# Patient Record
Sex: FEMALE | Employment: FULL TIME | URBAN - METROPOLITAN AREA
[De-identification: names, ages, dates, MRNs, and addresses within clinical notes are randomized per-mention and may not be internally consistent; named-entity substitution may affect disease eponyms.]

---

## 2021-12-16 PROBLEM — G25.0 ESSENTIAL TREMOR: Status: ACTIVE | Noted: 2021-12-16

## 2021-12-16 PROBLEM — G62.9 POLYNEUROPATHY: Status: ACTIVE | Noted: 2021-12-16

## 2021-12-16 PROBLEM — R20.2 PARESTHESIAS: Status: ACTIVE | Noted: 2021-12-16

## 2022-01-12 PROBLEM — N39.0 RECURRENT UTI: Status: ACTIVE | Noted: 2022-01-12

## 2022-09-30 ENCOUNTER — APPOINTMENT (OUTPATIENT)
Dept: ULTRASOUND IMAGING | Facility: HOSPITAL | Age: 57
End: 2022-09-30
Attending: STUDENT IN AN ORGANIZED HEALTH CARE EDUCATION/TRAINING PROGRAM
Payer: COMMERCIAL

## 2022-09-30 ENCOUNTER — HOSPITAL ENCOUNTER (OUTPATIENT)
Facility: HOSPITAL | Age: 57
Setting detail: OBSERVATION
Discharge: HOME | End: 2022-10-01
Attending: STUDENT IN AN ORGANIZED HEALTH CARE EDUCATION/TRAINING PROGRAM | Admitting: HOSPITALIST
Payer: COMMERCIAL

## 2022-09-30 ENCOUNTER — HOSPITAL ENCOUNTER (OUTPATIENT)
Age: 57
Discharge: ANOTHER HOSPITAL | End: 2022-09-30
Payer: COMMERCIAL

## 2022-09-30 ENCOUNTER — HOSPITAL ENCOUNTER (INPATIENT)
Facility: HOSPITAL | Age: 57
LOS: 1 days | Discharge: HOME | End: 2022-10-01
Attending: STUDENT IN AN ORGANIZED HEALTH CARE EDUCATION/TRAINING PROGRAM | Admitting: HOSPITALIST
Payer: COMMERCIAL

## 2022-09-30 VITALS
TEMPERATURE: 98 F | DIASTOLIC BLOOD PRESSURE: 97 MMHG | OXYGEN SATURATION: 99 % | SYSTOLIC BLOOD PRESSURE: 155 MMHG | HEART RATE: 76 BPM | RESPIRATION RATE: 18 BRPM

## 2022-09-30 DIAGNOSIS — K80.20 SYMPTOMATIC CHOLELITHIASIS: Primary | ICD-10-CM

## 2022-09-30 DIAGNOSIS — R19.5 DARK STOOLS: ICD-10-CM

## 2022-09-30 DIAGNOSIS — R74.01 TRANSAMINITIS: ICD-10-CM

## 2022-09-30 DIAGNOSIS — R10.11 ABDOMINAL PAIN, RIGHT UPPER QUADRANT: Primary | ICD-10-CM

## 2022-09-30 LAB
ALBUMIN SERPL-MCNC: 4.1 G/DL (ref 3.4–5)
ALP LIVER SERPL-CCNC: 188 U/L
ALT SERPL-CCNC: 117 U/L
AMPHET UR QL SCN: NEGATIVE
ANION GAP SERPL CALC-SCNC: 9 MMOL/L (ref 0–18)
AST SERPL-CCNC: 127 U/L (ref 15–37)
BASOPHILS # BLD AUTO: 0.05 X10(3) UL (ref 0–0.2)
BASOPHILS NFR BLD AUTO: 0.7 %
BENZODIAZ UR QL SCN: NEGATIVE
BILIRUB DIRECT SERPL-MCNC: 0.4 MG/DL (ref 0–0.2)
BILIRUB SERPL-MCNC: 1.1 MG/DL (ref 0.1–2)
BILIRUB UR QL: NEGATIVE
BUN BLD-MCNC: 5 MG/DL (ref 7–18)
BUN/CREAT SERPL: 7.4 (ref 10–20)
CALCIUM BLD-MCNC: 9.2 MG/DL (ref 8.5–10.1)
CANNABINOIDS UR QL SCN: NEGATIVE
CHLORIDE SERPL-SCNC: 103 MMOL/L (ref 98–112)
CO2 SERPL-SCNC: 26 MMOL/L (ref 21–32)
COCAINE UR QL: NEGATIVE
COLOR UR: YELLOW
CREAT BLD-MCNC: 0.68 MG/DL
CREAT UR-SCNC: 120 MG/DL
DEPRECATED RDW RBC AUTO: 61.4 FL (ref 35.1–46.3)
EOSINOPHIL # BLD AUTO: 0.03 X10(3) UL (ref 0–0.7)
EOSINOPHIL NFR BLD AUTO: 0.4 %
ERYTHROCYTE [DISTWIDTH] IN BLOOD BY AUTOMATED COUNT: 15.9 % (ref 11–15)
GFR SERPLBLD BASED ON 1.73 SQ M-ARVRAT: 102 ML/MIN/1.73M2 (ref 60–?)
GLUCOSE BLD-MCNC: 110 MG/DL (ref 70–99)
GLUCOSE UR-MCNC: NEGATIVE MG/DL
HCT VFR BLD AUTO: 37.6 %
HGB BLD-MCNC: 13.6 G/DL
IMM GRANULOCYTES # BLD AUTO: 0.04 X10(3) UL (ref 0–1)
IMM GRANULOCYTES NFR BLD: 0.6 %
KETONES UR-MCNC: 20 MG/DL
LEUKOCYTE ESTERASE UR QL STRIP.AUTO: NEGATIVE
LIPASE SERPL-CCNC: 107 U/L (ref 73–393)
LYMPHOCYTES # BLD AUTO: 1.46 X10(3) UL (ref 1–4)
LYMPHOCYTES NFR BLD AUTO: 21.6 %
MCH RBC QN AUTO: 39.5 PG (ref 26–34)
MCHC RBC AUTO-ENTMCNC: 36.2 G/DL (ref 31–37)
MCV RBC AUTO: 109.3 FL
MDMA UR QL SCN: NEGATIVE
MONOCYTES # BLD AUTO: 0.67 X10(3) UL (ref 0.1–1)
MONOCYTES NFR BLD AUTO: 9.9 %
NEUTROPHILS # BLD AUTO: 4.51 X10 (3) UL (ref 1.5–7.7)
NEUTROPHILS # BLD AUTO: 4.51 X10(3) UL (ref 1.5–7.7)
NEUTROPHILS NFR BLD AUTO: 66.8 %
NITRITE UR QL STRIP.AUTO: NEGATIVE
OSMOLALITY SERPL CALC.SUM OF ELEC: 284 MOSM/KG (ref 275–295)
OXYCODONE UR QL SCN: NEGATIVE
PH UR: 6 [PH] (ref 5–8)
PLATELET # BLD AUTO: 282 10(3)UL (ref 150–450)
POTASSIUM SERPL-SCNC: 3.3 MMOL/L (ref 3.5–5.1)
PROT SERPL-MCNC: 7.4 G/DL (ref 6.4–8.2)
PROT UR-MCNC: NEGATIVE MG/DL
RBC # BLD AUTO: 3.44 X10(6)UL
SARS-COV-2 RNA RESP QL NAA+PROBE: NOT DETECTED
SODIUM SERPL-SCNC: 138 MMOL/L (ref 136–145)
SP GR UR STRIP: 1.01 (ref 1–1.03)
UROBILINOGEN UR STRIP-ACNC: 2
VIT C UR-MCNC: NEGATIVE MG/DL
WBC # BLD AUTO: 6.8 X10(3) UL (ref 4–11)

## 2022-09-30 PROCEDURE — 76705 ECHO EXAM OF ABDOMEN: CPT | Performed by: STUDENT IN AN ORGANIZED HEALTH CARE EDUCATION/TRAINING PROGRAM

## 2022-09-30 PROCEDURE — 99204 OFFICE O/P NEW MOD 45 MIN: CPT | Performed by: NURSE PRACTITIONER

## 2022-09-30 PROCEDURE — 99222 1ST HOSP IP/OBS MODERATE 55: CPT | Performed by: HOSPITALIST

## 2022-09-30 RX ORDER — SODIUM CHLORIDE 9 MG/ML
INJECTION, SOLUTION INTRAVENOUS CONTINUOUS
Status: DISCONTINUED | OUTPATIENT
Start: 2022-09-30 | End: 2022-10-01

## 2022-09-30 RX ORDER — METRONIDAZOLE 500 MG/100ML
500 INJECTION, SOLUTION INTRAVENOUS ONCE
Status: COMPLETED | OUTPATIENT
Start: 2022-09-30 | End: 2022-09-30

## 2022-09-30 RX ORDER — HEPARIN SODIUM 5000 [USP'U]/ML
5000 INJECTION, SOLUTION INTRAVENOUS; SUBCUTANEOUS EVERY 12 HOURS SCHEDULED
Status: DISCONTINUED | OUTPATIENT
Start: 2022-10-01 | End: 2022-10-01

## 2022-09-30 RX ORDER — METRONIDAZOLE 500 MG/100ML
500 INJECTION, SOLUTION INTRAVENOUS EVERY 8 HOURS
Status: DISCONTINUED | OUTPATIENT
Start: 2022-10-01 | End: 2022-10-01

## 2022-09-30 RX ORDER — ONDANSETRON 2 MG/ML
4 INJECTION INTRAMUSCULAR; INTRAVENOUS ONCE
Status: COMPLETED | OUTPATIENT
Start: 2022-09-30 | End: 2022-09-30

## 2022-09-30 RX ORDER — ONDANSETRON 2 MG/ML
4 INJECTION INTRAMUSCULAR; INTRAVENOUS EVERY 6 HOURS PRN
Status: DISCONTINUED | OUTPATIENT
Start: 2022-09-30 | End: 2022-10-01

## 2022-09-30 RX ORDER — PROCHLORPERAZINE EDISYLATE 5 MG/ML
5 INJECTION INTRAMUSCULAR; INTRAVENOUS EVERY 8 HOURS PRN
Status: DISCONTINUED | OUTPATIENT
Start: 2022-09-30 | End: 2022-10-01

## 2022-09-30 RX ORDER — MORPHINE SULFATE 4 MG/ML
4 INJECTION, SOLUTION INTRAMUSCULAR; INTRAVENOUS ONCE
Status: COMPLETED | OUTPATIENT
Start: 2022-09-30 | End: 2022-09-30

## 2022-09-30 RX ORDER — MORPHINE SULFATE 2 MG/ML
2 INJECTION, SOLUTION INTRAMUSCULAR; INTRAVENOUS EVERY 4 HOURS PRN
Status: DISCONTINUED | OUTPATIENT
Start: 2022-09-30 | End: 2022-10-01

## 2022-09-30 RX ORDER — MORPHINE SULFATE 2 MG/ML
4 INJECTION, SOLUTION INTRAMUSCULAR; INTRAVENOUS EVERY 4 HOURS PRN
Status: DISCONTINUED | OUTPATIENT
Start: 2022-09-30 | End: 2022-10-01

## 2022-09-30 NOTE — ED INITIAL ASSESSMENT (HPI)
Patient presents with concern of right side pain since Wednesday; endorses nausea, poor appetite, dark colored diarrhea. Denies vomiting, denies pain radiating to the back or abdomen. Reports use of tylenol for pain with minimal improvement.

## 2022-09-30 NOTE — ED INITIAL ASSESSMENT (HPI)
Patient arrives ambulatory through triage with c/o of RUQ pain since wed. Patient c/o of nausea as well.  Hx of gallstones

## 2022-10-01 VITALS
SYSTOLIC BLOOD PRESSURE: 127 MMHG | BODY MASS INDEX: 26 KG/M2 | RESPIRATION RATE: 16 BRPM | TEMPERATURE: 98 F | DIASTOLIC BLOOD PRESSURE: 74 MMHG | HEART RATE: 67 BPM | WEIGHT: 154.88 LBS | OXYGEN SATURATION: 99 %

## 2022-10-01 LAB
ALBUMIN SERPL-MCNC: 3.1 G/DL (ref 3.4–5)
ALBUMIN/GLOB SERPL: 1.2 {RATIO} (ref 1–2)
ALP LIVER SERPL-CCNC: 139 U/L
ALT SERPL-CCNC: 89 U/L
ANION GAP SERPL CALC-SCNC: 4 MMOL/L (ref 0–18)
AST SERPL-CCNC: 81 U/L (ref 15–37)
BASOPHILS # BLD AUTO: 0.05 X10(3) UL (ref 0–0.2)
BASOPHILS NFR BLD AUTO: 0.8 %
BILIRUB SERPL-MCNC: 0.8 MG/DL (ref 0.1–2)
BUN BLD-MCNC: 4 MG/DL (ref 7–18)
BUN/CREAT SERPL: 7.4 (ref 10–20)
CALCIUM BLD-MCNC: 8.1 MG/DL (ref 8.5–10.1)
CHLORIDE SERPL-SCNC: 108 MMOL/L (ref 98–112)
CO2 SERPL-SCNC: 30 MMOL/L (ref 21–32)
CREAT BLD-MCNC: 0.54 MG/DL
DEPRECATED RDW RBC AUTO: 62.6 FL (ref 35.1–46.3)
EOSINOPHIL # BLD AUTO: 0.11 X10(3) UL (ref 0–0.7)
EOSINOPHIL NFR BLD AUTO: 1.8 %
ERYTHROCYTE [DISTWIDTH] IN BLOOD BY AUTOMATED COUNT: 15.9 % (ref 11–15)
EST. AVERAGE GLUCOSE BLD GHB EST-MCNC: 85 MG/DL (ref 68–126)
GFR SERPLBLD BASED ON 1.73 SQ M-ARVRAT: 108 ML/MIN/1.73M2 (ref 60–?)
GLOBULIN PLAS-MCNC: 2.5 G/DL (ref 2.8–4.4)
GLUCOSE BLD-MCNC: 117 MG/DL (ref 70–99)
HBA1C MFR BLD: 4.6 % (ref ?–5.7)
HCT VFR BLD AUTO: 32.3 %
HGB BLD-MCNC: 11.5 G/DL
IMM GRANULOCYTES # BLD AUTO: 0.04 X10(3) UL (ref 0–1)
IMM GRANULOCYTES NFR BLD: 0.7 %
INR BLD: 1.07 (ref 0.85–1.16)
LYMPHOCYTES # BLD AUTO: 1.69 X10(3) UL (ref 1–4)
LYMPHOCYTES NFR BLD AUTO: 27.9 %
MAGNESIUM SERPL-MCNC: 1.4 MG/DL (ref 1.6–2.6)
MAGNESIUM SERPL-MCNC: 1.7 MG/DL (ref 1.6–2.6)
MCH RBC QN AUTO: 39.4 PG (ref 26–34)
MCHC RBC AUTO-ENTMCNC: 35.6 G/DL (ref 31–37)
MCV RBC AUTO: 110.6 FL
MONOCYTES # BLD AUTO: 0.6 X10(3) UL (ref 0.1–1)
MONOCYTES NFR BLD AUTO: 9.9 %
NEUTROPHILS # BLD AUTO: 3.56 X10 (3) UL (ref 1.5–7.7)
NEUTROPHILS # BLD AUTO: 3.56 X10(3) UL (ref 1.5–7.7)
NEUTROPHILS NFR BLD AUTO: 58.9 %
OSMOLALITY SERPL CALC.SUM OF ELEC: 292 MOSM/KG (ref 275–295)
PHOSPHATE SERPL-MCNC: 2.5 MG/DL (ref 2.5–4.9)
PLATELET # BLD AUTO: 216 10(3)UL (ref 150–450)
POTASSIUM SERPL-SCNC: 3.1 MMOL/L (ref 3.5–5.1)
PROT SERPL-MCNC: 5.6 G/DL (ref 6.4–8.2)
PROTHROMBIN TIME: 13.8 SECONDS (ref 11.6–14.8)
RBC # BLD AUTO: 2.92 X10(6)UL
SODIUM SERPL-SCNC: 142 MMOL/L (ref 136–145)
WBC # BLD AUTO: 6.1 X10(3) UL (ref 4–11)

## 2022-10-01 PROCEDURE — 99222 1ST HOSP IP/OBS MODERATE 55: CPT | Performed by: SURGERY

## 2022-10-01 PROCEDURE — 99222 1ST HOSP IP/OBS MODERATE 55: CPT | Performed by: INTERNAL MEDICINE

## 2022-10-01 PROCEDURE — 99239 HOSP IP/OBS DSCHRG MGMT >30: CPT | Performed by: HOSPITALIST

## 2022-10-01 RX ORDER — POTASSIUM CHLORIDE 14.9 MG/ML
20 INJECTION INTRAVENOUS ONCE
Status: COMPLETED | OUTPATIENT
Start: 2022-10-01 | End: 2022-10-01

## 2022-10-01 RX ORDER — ESCITALOPRAM OXALATE 10 MG/1
10 TABLET ORAL DAILY
Qty: 30 TABLET | Refills: 1 | Status: SHIPPED | OUTPATIENT
Start: 2022-10-01

## 2022-10-01 RX ORDER — ACETAMINOPHEN 325 MG/1
650 TABLET ORAL EVERY 6 HOURS PRN
Status: DISCONTINUED | OUTPATIENT
Start: 2022-10-01 | End: 2022-10-01

## 2022-10-01 RX ORDER — MAGNESIUM OXIDE 400 MG/1
800 TABLET ORAL ONCE
Status: COMPLETED | OUTPATIENT
Start: 2022-10-01 | End: 2022-10-01

## 2022-10-01 NOTE — PLAN OF CARE
Problem: Patient Centered Care  Goal: Patient preferences are identified and integrated in the patient's plan of care  Description: Interventions:  - What would you like us to know as we care for you? \"I am from home alone. My  recently passed away last week. \"  - Provide timely, complete, and accurate information to patient/family  - Incorporate patient and family knowledge, values, beliefs, and cultural backgrounds into the planning and delivery of care  - Encourage patient/family to participate in care and decision-making at the level they choose  - Honor patient and family perspectives and choices  Outcome: Progressing     Problem: Patient/Family Goals  Goal: Patient/Family Long Term Goal  Description: Patient's Long Term Goal: \"to go home\"    Interventions:  -Follow up MD appt  -Take meds as prescribed  - See additional Care Plan goals for specific interventions  Outcome: Progressing  Goal: Patient/Family Short Term Goal  Description: Patient's Short Term Goal: \"to prevent pain\"    Interventions:   -Monitor VS  -Check labs results  -Administer IVF and IV ABT as ordered  -Provide pain meds as prescribed  - See additional Care Plan goals for specific interventions  Outcome: Progressing     Problem: Pre-existing medical condition  Goal: Maintenance of stable medical condition  Description: INTERVENTIONS:  - Administer medications and treatments as ordered  - Monitor for and notify internist of changes as needed  - Conduct history & physical assessment within 24 hours of admission  - Assess & evaluate for further medical treatment if indicated  Outcome: Progressing     Problem: SAFETY ADULT - FALL  Goal: Free from fall injury  Description: INTERVENTIONS AS NEEDED:  - Assess patient for physical needs  - Identify cognitive and physical deficits and behaviors that affect risk of falls  - [place] fall precautions as indicated by assessment  - Educate patient/family on patient safety including physical imitations  - Instruct patient to call for assistance with activity based on assessment  - Modify environment to reduce risk of injury  - Provide assistive devices as appropriate  - Consider occupational and/or physical therapy consult to assist with strengthening/mobility  - Encourage toileting schedule  - Evaluate and order additional treatment as indicated  Outcome: Progressing     Problem: Risk for Infection  Goal: Absence of fever/infection during anticipated neutropenic period  Description: INTERVENTIONS:  - Monitor WBC  - Implement neutropenic guidelines  Outcome: Progressing     Problem: GASTROINTESTINAL - ADULT  Goal: Minimal or absence of nausea and vomiting  Description: INTERVENTIONS:  - Maintain adequate hydration with IV or PO as ordered and tolerated  - Nasogastric tube to low intermittent suction as ordered  - Evaluate effectiveness of ordered antiemetic medications  - Provide nonpharmacologic comfort measures as appropriate  - Advance diet as tolerated, if ordered  - Obtain nutritional consult as needed  - Evaluate fluid balance  Outcome: Progressing  Goal: Maintains or returns to baseline bowel function  Description: INTERVENTIONS:  - Assess bowel function  - Maintain adequate hydration with IV or PO as ordered and tolerated  - Evaluate effectiveness of GI medications  - Encourage mobilization and activity  - Obtain nutritional consult as needed  - Establish a toileting routine/schedule  - Consider collaborating with pharmacy to review patient's medication profile  Outcome: Progressing     Problem: ANXIETY  Goal: Will report anxiety at manageable levels  Description: INTERVENTIONS:  - Administer medication as ordered  - Teach and rehearse alternative coping skills  - Provide emotional support with 1:1 interaction with staff  Outcome: Progressing     Problem: DEATH & DYING  Goal: Pt/Family communicate acceptance of impending death and feel psychological comfort and peace  Description: INTERVENTIONS:  - Assess patient/family anxiety and grief process related to end of life issues  - Provide emotional and spiritual support  - Provide information about the patient's health status with consideration of family and cultural values  - Communicate willingness to discuss death and facilitate grief process  with patient/family as appropriate  - Emphasize sustaining relationships within family system and community, or brittney/spiritual traditions  - Initiate Spiritual Care consult as needed  Outcome: Progressing     Problem: DRUG ABUSE/DETOX  Goal: Will have no detox symptoms and will verbalize plan for changing drug-related behavior  Description: INTERVENTIONS:  - Administer medication as ordered  - Monitor physical status  - Provide emotional support with 1:1 interaction with staff  - Encourage 12-Step involvement or recovery focused alternative  Outcome: Progressing     Patient is alert and oriented x 4, admitted with diagnosis of symptomatic cholelithiases. She reported RUQ abd pain with painscale of 4/10, will administer pain meds given as needed. Oriented to room. Safety, seizure, and fall precautions initiated. Currently on CIWA protocol. Call light within reach. Frequent rounding by nursing staff.

## 2022-10-01 NOTE — PLAN OF CARE
Peripheral IV removed, Tele removed, Pt belongings home with patient and her sister. AVS reviewed with patient. Problem: Patient Centered Care  Goal: Patient preferences are identified and integrated in the patient's plan of care  Description: Interventions:  - What would you like us to know as we care for you? \"I am from home alone. My  recently passed away last week. \"  - Provide timely, complete, and accurate information to patient/family  - Incorporate patient and family knowledge, values, beliefs, and cultural backgrounds into the planning and delivery of care  - Encourage patient/family to participate in care and decision-making at the level they choose  - Honor patient and family perspectives and choices  Outcome: Completed     Problem: Patient/Family Goals  Goal: Patient/Family Long Term Goal  Description: Patient's Long Term Goal: \"to go home\"    Interventions:  -Follow up MD appt  -Take meds as prescribed  - See additional Care Plan goals for specific interventions  Outcome: Completed  Goal: Patient/Family Short Term Goal  Description: Patient's Short Term Goal: \"to prevent pain\"    Interventions:   -Monitor VS  -Check labs results  -Administer IVF and IV ABT as ordered  -Provide pain meds as prescribed  - See additional Care Plan goals for specific interventions  Outcome: Completed     Problem: Pre-existing medical condition  Goal: Maintenance of stable medical condition  Description: INTERVENTIONS:  - Administer medications and treatments as ordered  - Monitor for and notify internist of changes as needed  - Conduct history & physical assessment within 24 hours of admission  - Assess & evaluate for further medical treatment if indicated  Outcome: Completed     Problem: SAFETY ADULT - FALL  Goal: Free from fall injury  Description: INTERVENTIONS AS NEEDED:  - Assess patient for physical needs  - Identify cognitive and physical deficits and behaviors that affect risk of falls  - [place] fall precautions as indicated by assessment  - Educate patient/family on patient safety including physical imitations  - Instruct patient to call for assistance with activity based on assessment  - Modify environment to reduce risk of injury  - Provide assistive devices as appropriate  - Consider occupational and/or physical therapy consult to assist with strengthening/mobility  - Encourage toileting schedule  - Evaluate and order additional treatment as indicated  Outcome: Completed     Problem: Risk for Infection  Goal: Absence of fever/infection during anticipated neutropenic period  Description: INTERVENTIONS:  - Monitor WBC  - Implement neutropenic guidelines  Outcome: Completed     Problem: GASTROINTESTINAL - ADULT  Goal: Minimal or absence of nausea and vomiting  Description: INTERVENTIONS:  - Maintain adequate hydration with IV or PO as ordered and tolerated  - Nasogastric tube to low intermittent suction as ordered  - Evaluate effectiveness of ordered antiemetic medications  - Provide nonpharmacologic comfort measures as appropriate  - Advance diet as tolerated, if ordered  - Obtain nutritional consult as needed  - Evaluate fluid balance  Outcome: Completed  Goal: Maintains or returns to baseline bowel function  Description: INTERVENTIONS:  - Assess bowel function  - Maintain adequate hydration with IV or PO as ordered and tolerated  - Evaluate effectiveness of GI medications  - Encourage mobilization and activity  - Obtain nutritional consult as needed  - Establish a toileting routine/schedule  - Consider collaborating with pharmacy to review patient's medication profile  Outcome: Completed     Problem: ANXIETY  Goal: Will report anxiety at manageable levels  Description: INTERVENTIONS:  - Administer medication as ordered  - Teach and rehearse alternative coping skills  - Provide emotional support with 1:1 interaction with staff  Outcome: Completed     Problem: DEATH & DYING  Goal: Pt/Family communicate acceptance of impending death and feel psychological comfort and peace  Description: INTERVENTIONS:  - Assess patient/family anxiety and grief process related to end of life issues  - Provide emotional and spiritual support  - Provide information about the patient's health status with consideration of family and cultural values  - Communicate willingness to discuss death and facilitate grief process  with patient/family as appropriate  - Emphasize sustaining relationships within family system and community, or brittney/spiritual traditions  - Initiate Spiritual Care consult as needed  Outcome: Completed     Problem: DRUG ABUSE/DETOX  Goal: Will have no detox symptoms and will verbalize plan for changing drug-related behavior  Description: INTERVENTIONS:  - Administer medication as ordered  - Monitor physical status  - Provide emotional support with 1:1 interaction with staff  - Encourage 12-Step involvement or recovery focused alternative  Outcome: Completed

## 2022-10-01 NOTE — PROGRESS NOTES
ADMISSION NOTE    65year old female with h/o HTN, nephrolithiasis, cholelithiasis  presents with RUQ pain. Reports that she has been drinking alcohol regularly over the past week as her  passed away. US gall stones,  LFTs elevated. . Available medical records partially reviewed. Dictation to follow.     Genevieve Segundo M.D.  9/30/2022

## 2022-10-01 NOTE — ED QUICK NOTES
Orders for admission, patient is aware of plan and ready to go upstairs. Any questions, please call ED RN Tanner Paulino at extension [phone].      Patient Covid vaccination status: Fully vaccinated     COVID Test Ordered in ED: Rapid SARS-CoV-2 by PCR    COVID Suspicion at Admission: Low clinical suspicion for COVID    Running Infusions:  rocephin, flagyl due next     Mental Status/LOC at time of transport: a/ox4    Other pertinent information: pt coming up w/ rocephin running; flagyl coming with to start next

## 2022-10-01 NOTE — H&P
[facility]    PATIENT'S NAME: Derik Mahoney   ATTENDING PHYSICIAN: Susan Vasquez MD   PATIENT ACCOUNT#:   [de-identified]    LOCATION:  [address] [place] [address] RECORD #:   [id number]       YOB: 1965  ADMISSION DATE:       09/30/2022    HISTORY AND PHYSICAL EXAMINATION    DATE OF EXAMINATION:  09/30/2022    CHIEF COMPLAINT:  Abdominal pain, nausea, and diarrhea. HISTORY OF PRESENT ILLNESS:  This is a very pleasant 18-year-old woman with a past medical history of hypertension, nephrolithiasis and cholelithiasis. She states that in November 2021, she had some mild vague abdominal discomfort for which she had a workup. She is not sure what exact testing was done, but she was told at the time that she did have some gallstones. The discomfort at that time, however, was not severe. On Wednesday, she developed pain in the right upper quadrant below her ribcage radiating to her back. It was associated with poor appetite. No fever or chills. She did have nausea and on the day of admission, had 3 episodes of diarrhea in the morning with a small stool, but it was liquidy. She denies any travel history. No new medication. The discomfort has never been as severe as this was. She also tells me that her  who had a lung transplant for pulmonary fibrosis 3 years ago was quite ill for the last several months and passed away about 1 week and a half ago. She says that since that time she has been drinking regularly 3 to 4 drinks a day, wine or vodka and ginger ale. She said that prior to this she drank rarely. She denies any history of any alcohol-related issues in the past.  No history of pancreatitis. Last drink was the day prior to admission. She denies any history of alcohol withdrawal symptoms. Her evaluation in the emergency room included an ultrasound of the gallbladder that revealed cholelithiasis with no additional sonographic manifestations of acute cholecystitis.   There was no significant biliary ductal dilatation. There were some small simple-appearing renal cysts and mild dilatation of the right renal pelvis with no velma hydronephrosis. This could be related to a variant extrarenal pelvis but are incompletely assessed by the ultrasound. Her urine revealed 1 to 5 white cells, 0 to 2 red cells, 1+ bacteria, few squamous epithelial cells, leukocyte esterase and nitrite negative. Glucose was 110, sodium 138, potassium 3.3, chloride 103, CO2 of 26, BUN of 5 with a creatinine 0.68, calcium 9.2, anion gap of 9. Alkaline phosphatase 188, , , total bilirubin 1.1 and direct bilirubin 0.4 and lipase was normal at 107. Albumin was normal at 4.1. White count was 6.8 with a hemoglobin of 13.6 and a platelet count of 715,894. MCV was 109. There were 66% neutrophils and 21% lymphocytes. Rapid COVID testing was negative. The emergency room physician consulted GI who requested surgical evaluation as well and the patient was admitted for further evaluation and treatment. PAST MEDICAL HISTORY:  Significant for hypertension, kidney stones, history of SVT in the past.  She was placed on diltiazem to cover both her hypertension and SVT and as far she knows has not had any further episodes of SVT. Cholelithiasis. PAST SURGICAL HISTORY:  Foot surgery after a fracture, robotic-assisted hysterectomy, cystoscopy with removal of bladder stone in the past, lithotripsy and stent placement for kidney stones previously as well. ALLERGIES:  Macrobid apparently caused neuropathy. FAMILY HISTORY:  Her mother is 80 and apparently healthy. Her father  at 80. He had coronary artery disease and had his first heart attack at 58. He also had diabetes. SOCIAL HISTORY:  The patient does not smoke. Previously, drank alcohol rarely over the last few months during her 's illness and recent death. She has had 3 to 4 drinks of wine or vodka daily.   She works as a  and is concerned about getting back to work as she has been off for 2 weeks following her 's death. REVIEW OF SYSTEMS:  Her appetite has been poor. She denies any dysuria, increased frequency of urination or hematuria. She had an episode of diarrhea but denies any recent antibiotics or travel history. No melena, hematochezia. No dysuria. There are otherwise no additional pertinent positives or negatives on the 12-point review of systems except as listed in the History of Present Illness. PHYSICAL EXAMINATION:    VITAL SIGNS:  Temperature was 98.3, pulse 69, respiratory rate 16, blood pressure 153/82, O2 saturation 94% on room air. HEENT:  Normocephalic, atraumatic. Pupils equal, reactive. Sclerae anicteric. There was no sinus tenderness. Mucous membranes were moist.  NECK:  Supple. LUNGS:  Clear with easy respiratory excursions. I did not appreciate any wheezes, rhonchi, or rales. HEART:  Regular rate and rhythm. Normal S1, S2.  ABDOMEN:  Soft. There was tenderness to palpation in the right upper quadrant, with no guarding or rebound. Bowel sounds were slightly hypoactive. EXTREMITIES:  No clubbing, cyanosis, calf tenderness, palpable cords, or edema. SKIN:  Warm and dry without any significant rashes. NEUROLOGIC:  The patient was awake, alert, oriented x3 with no focal motor or sensory deficits. PSYCHIATRIC:  Her mood and affect were depressed but very cooperative with the interview and exam.  She was tearful at times, particularly when talking about her . BACK:  There was no costovertebral angle tenderness noted. LABORATORY DATA:  Previously mentioned. ASSESSMENT AND PLAN:    1. Right upper quadrant abdominal pain with mildly elevated liver function tests and evidence of cholelithiasis. No ultrasound evidence of cholecystitis or biliary ductal dilatation.   Question whether the patient's current symptoms are related to an alcohol-induced hepatitis versus cholecystitis. We will start antibiotics tonight, pending evaluation by GI and Surgical Services. This could be discontinued if they do not feel the patient should remain on antibiotics. Question whether a HIDA scan would be of benefit or whether a CT scan would be helpful. In the meantime, we will treat symptomatically with antiemetics and pain medications. Again, await GI and Surgical recommendations. Will also place on proton pump inhibitor. 2.   Hypertension. Blood pressure has ranged for the most part between 130 and 150. We will continue diltiazem in the morning as long as her blood pressure is higher than 130 as we would like to avoid any issues with SVT during her illness. We will place on remote telemetry for further monitoring. 3.   Hypokalemia. Check magnesium level and replace potassium. 4.   Mild nonfasting hyperglycemia, likely stress related. We will check A1c level. 5.   The patient is not anemic, but does have an elevated MCV and RDW. May be related to alcohol consumption. We will place on banana bag with thiamine, folic acid and MVI. 6.   Increased alcohol intake over the past 3 months or so. The patient has no history in the past other than a symptoms of alcohol withdrawal.  We will however place on CIWA protocol. We will not order benzodiazepines unless the patient becomes symptomatic as I would like to track these as she also needs narcotic pain medications for her abdominal pain. 7.   DVT prophylaxis. We will place on SCDs. We will hold anticoagulation until after GI and surgical evaluation in case any invasive procedures are planned today. The patient may be up ad wilman. We will also place the SCDs. 8.   Recent loss of her  with situational depression. Follow up with her primary care provider. 9.   Ultrasound revealed mild dilatation of the right renal pelvis with no hydronephrosis. Could be related to a variant extrarenal pelvis.   Recommend the patient to follow up with her primary care physician as an outpatient, perhaps obtain a nonemergent kidney ultrasound. 10. The patient's current clinical status and proposed treatment plan was discussed with her. All of her questions were answered, and she agreed with the plan of care as outlined above.      Dictated By Ilene Gurrola MD  d: 10/01/2022 03:43:38  t: 10/01/2022 03:57:58  [facility] [id number]  KEANU/

## 2022-10-11 ENCOUNTER — TELEPHONE (OUTPATIENT)
Dept: SURGERY | Facility: CLINIC | Age: 57
End: 2022-10-11

## 2022-10-21 ENCOUNTER — OFFICE VISIT (OUTPATIENT)
Dept: SURGERY | Facility: CLINIC | Age: 57
End: 2022-10-21
Payer: COMMERCIAL

## 2022-10-21 VITALS — WEIGHT: 154 LBS | BODY MASS INDEX: 26 KG/M2

## 2022-10-21 DIAGNOSIS — K80.00 CHOLELITHIASIS AND ACUTE CHOLECYSTITIS WITHOUT OBSTRUCTION: Primary | ICD-10-CM

## 2022-10-21 PROCEDURE — 99214 OFFICE O/P EST MOD 30 MIN: CPT | Performed by: SURGERY

## 2022-11-15 ENCOUNTER — LAB ENCOUNTER (OUTPATIENT)
Dept: LAB | Age: 57
End: 2022-11-15
Attending: SURGERY
Payer: COMMERCIAL

## 2022-11-15 DIAGNOSIS — Z01.818 PREOP TESTING: ICD-10-CM

## 2022-11-16 LAB — SARS-COV-2 RNA RESP QL NAA+PROBE: NOT DETECTED

## 2022-11-16 RX ORDER — ALPRAZOLAM 0.25 MG/1
0.25 TABLET ORAL DAILY PRN
COMMUNITY
Start: 2022-10-26

## 2022-11-17 ENCOUNTER — ANESTHESIA (OUTPATIENT)
Dept: SURGERY | Facility: HOSPITAL | Age: 57
End: 2022-11-17
Payer: COMMERCIAL

## 2022-11-17 ENCOUNTER — HOSPITAL ENCOUNTER (OUTPATIENT)
Facility: HOSPITAL | Age: 57
Setting detail: HOSPITAL OUTPATIENT SURGERY
Discharge: HOME | End: 2022-11-17
Attending: SURGERY | Admitting: SURGERY
Payer: COMMERCIAL

## 2022-11-17 ENCOUNTER — ANESTHESIA EVENT (OUTPATIENT)
Dept: SURGERY | Facility: HOSPITAL | Age: 57
End: 2022-11-17
Payer: COMMERCIAL

## 2022-11-17 VITALS
RESPIRATION RATE: 16 BRPM | HEART RATE: 56 BPM | TEMPERATURE: 98 F | HEIGHT: 65 IN | OXYGEN SATURATION: 98 % | BODY MASS INDEX: 24.32 KG/M2 | SYSTOLIC BLOOD PRESSURE: 130 MMHG | WEIGHT: 146 LBS | DIASTOLIC BLOOD PRESSURE: 73 MMHG

## 2022-11-17 DIAGNOSIS — Z01.818 PREOP TESTING: Primary | ICD-10-CM

## 2022-11-17 DIAGNOSIS — K80.00 CHOLELITHIASIS AND ACUTE CHOLECYSTITIS WITHOUT OBSTRUCTION: ICD-10-CM

## 2022-11-17 PROCEDURE — 0FT44ZZ RESECTION OF GALLBLADDER, PERCUTANEOUS ENDOSCOPIC APPROACH: ICD-10-PCS | Performed by: SURGERY

## 2022-11-17 PROCEDURE — 47562 LAPAROSCOPIC CHOLECYSTECTOMY: CPT | Performed by: SURGERY

## 2022-11-17 RX ORDER — MORPHINE SULFATE 4 MG/ML
4 INJECTION, SOLUTION INTRAMUSCULAR; INTRAVENOUS EVERY 10 MIN PRN
Status: DISCONTINUED | OUTPATIENT
Start: 2022-11-17 | End: 2022-11-17

## 2022-11-17 RX ORDER — HYDROMORPHONE HYDROCHLORIDE 1 MG/ML
0.4 INJECTION, SOLUTION INTRAMUSCULAR; INTRAVENOUS; SUBCUTANEOUS EVERY 5 MIN PRN
Status: DISCONTINUED | OUTPATIENT
Start: 2022-11-17 | End: 2022-11-17

## 2022-11-17 RX ORDER — ONDANSETRON 2 MG/ML
INJECTION INTRAMUSCULAR; INTRAVENOUS AS NEEDED
Status: DISCONTINUED | OUTPATIENT
Start: 2022-11-17 | End: 2022-11-17 | Stop reason: SURG

## 2022-11-17 RX ORDER — HYDROCODONE BITARTRATE AND ACETAMINOPHEN 5; 325 MG/1; MG/1
1 TABLET ORAL EVERY 6 HOURS PRN
Qty: 12 TABLET | Refills: 0 | Status: SHIPPED | OUTPATIENT
Start: 2022-11-17

## 2022-11-17 RX ORDER — DEXAMETHASONE SODIUM PHOSPHATE 4 MG/ML
VIAL (ML) INJECTION AS NEEDED
Status: DISCONTINUED | OUTPATIENT
Start: 2022-11-17 | End: 2022-11-17 | Stop reason: SURG

## 2022-11-17 RX ORDER — NALOXONE HYDROCHLORIDE 0.4 MG/ML
80 INJECTION, SOLUTION INTRAMUSCULAR; INTRAVENOUS; SUBCUTANEOUS AS NEEDED
Status: DISCONTINUED | OUTPATIENT
Start: 2022-11-17 | End: 2022-11-17

## 2022-11-17 RX ORDER — SODIUM CHLORIDE, SODIUM LACTATE, POTASSIUM CHLORIDE, CALCIUM CHLORIDE 600; 310; 30; 20 MG/100ML; MG/100ML; MG/100ML; MG/100ML
INJECTION, SOLUTION INTRAVENOUS CONTINUOUS
Status: DISCONTINUED | OUTPATIENT
Start: 2022-11-17 | End: 2022-11-17

## 2022-11-17 RX ORDER — MORPHINE SULFATE 10 MG/ML
6 INJECTION, SOLUTION INTRAMUSCULAR; INTRAVENOUS EVERY 10 MIN PRN
Status: DISCONTINUED | OUTPATIENT
Start: 2022-11-17 | End: 2022-11-17

## 2022-11-17 RX ORDER — CEFAZOLIN SODIUM/WATER 2 G/20 ML
2 SYRINGE (ML) INTRAVENOUS ONCE
Status: COMPLETED | OUTPATIENT
Start: 2022-11-17 | End: 2022-11-17

## 2022-11-17 RX ORDER — LIDOCAINE HYDROCHLORIDE 10 MG/ML
INJECTION, SOLUTION EPIDURAL; INFILTRATION; INTRACAUDAL; PERINEURAL AS NEEDED
Status: DISCONTINUED | OUTPATIENT
Start: 2022-11-17 | End: 2022-11-17 | Stop reason: SURG

## 2022-11-17 RX ORDER — HYDROMORPHONE HYDROCHLORIDE 1 MG/ML
0.2 INJECTION, SOLUTION INTRAMUSCULAR; INTRAVENOUS; SUBCUTANEOUS EVERY 5 MIN PRN
Status: DISCONTINUED | OUTPATIENT
Start: 2022-11-17 | End: 2022-11-17

## 2022-11-17 RX ORDER — ROCURONIUM BROMIDE 10 MG/ML
INJECTION, SOLUTION INTRAVENOUS AS NEEDED
Status: DISCONTINUED | OUTPATIENT
Start: 2022-11-17 | End: 2022-11-17 | Stop reason: SURG

## 2022-11-17 RX ORDER — LABETALOL HYDROCHLORIDE 5 MG/ML
INJECTION, SOLUTION INTRAVENOUS AS NEEDED
Status: DISCONTINUED | OUTPATIENT
Start: 2022-11-17 | End: 2022-11-17 | Stop reason: SURG

## 2022-11-17 RX ORDER — HYDROMORPHONE HYDROCHLORIDE 1 MG/ML
0.6 INJECTION, SOLUTION INTRAMUSCULAR; INTRAVENOUS; SUBCUTANEOUS EVERY 5 MIN PRN
Status: DISCONTINUED | OUTPATIENT
Start: 2022-11-17 | End: 2022-11-17

## 2022-11-17 RX ORDER — BUPIVACAINE HYDROCHLORIDE AND EPINEPHRINE 5; 5 MG/ML; UG/ML
INJECTION, SOLUTION PERINEURAL AS NEEDED
Status: DISCONTINUED | OUTPATIENT
Start: 2022-11-17 | End: 2022-11-17

## 2022-11-17 RX ORDER — MORPHINE SULFATE 4 MG/ML
2 INJECTION, SOLUTION INTRAMUSCULAR; INTRAVENOUS EVERY 10 MIN PRN
Status: DISCONTINUED | OUTPATIENT
Start: 2022-11-17 | End: 2022-11-17

## 2022-11-17 RX ORDER — ACETAMINOPHEN 500 MG
1000 TABLET ORAL ONCE
Status: COMPLETED | OUTPATIENT
Start: 2022-11-17 | End: 2022-11-17

## 2022-11-17 RX ORDER — IBUPROFEN 600 MG/1
600 TABLET ORAL EVERY 8 HOURS PRN
Qty: 30 TABLET | Refills: 0 | Status: SHIPPED | OUTPATIENT
Start: 2022-11-17

## 2022-11-17 RX ADMIN — LABETALOL HYDROCHLORIDE 5 MG: 5 INJECTION, SOLUTION INTRAVENOUS at 09:21:00

## 2022-11-17 RX ADMIN — ROCURONIUM BROMIDE 40 MG: 10 INJECTION, SOLUTION INTRAVENOUS at 08:58:00

## 2022-11-17 RX ADMIN — SODIUM CHLORIDE, SODIUM LACTATE, POTASSIUM CHLORIDE, CALCIUM CHLORIDE: 600; 310; 30; 20 INJECTION, SOLUTION INTRAVENOUS at 09:53:00

## 2022-11-17 RX ADMIN — DEXAMETHASONE SODIUM PHOSPHATE 8 MG: 4 MG/ML VIAL (ML) INJECTION at 08:58:00

## 2022-11-17 RX ADMIN — ONDANSETRON 4 MG: 2 INJECTION INTRAMUSCULAR; INTRAVENOUS at 08:58:00

## 2022-11-17 RX ADMIN — CEFAZOLIN SODIUM/WATER 2 G: 2 G/20 ML SYRINGE (ML) INTRAVENOUS at 09:01:00

## 2022-11-17 RX ADMIN — SODIUM CHLORIDE, SODIUM LACTATE, POTASSIUM CHLORIDE, CALCIUM CHLORIDE: 600; 310; 30; 20 INJECTION, SOLUTION INTRAVENOUS at 08:53:00

## 2022-11-17 RX ADMIN — LIDOCAINE HYDROCHLORIDE 25 MG: 10 INJECTION, SOLUTION EPIDURAL; INFILTRATION; INTRACAUDAL; PERINEURAL at 08:58:00

## 2022-11-17 NOTE — INTERVAL H&P NOTE
Pre-op Diagnosis: Cholelithiasis and acute cholecystitis without obstruction [K80.00]    The above referenced H&P was reviewed by Román Granados MD on 11/17/2022, the patient was examined and no significant changes have occurred in the patient's condition since the H&P was performed. I discussed with the patient and/or legal representative the potential benefits, risks and side effects of this procedure; the likelihood of the patient achieving goals; and potential problems that might occur during recuperation. I discussed reasonable alternatives to the procedure, including risks, benefits and side effects related to the alternatives and risks related to not receiving this procedure. We will proceed with procedure as planned.

## 2022-11-17 NOTE — OPERATIVE REPORT
Patient Name:  Esha Quinones  MR:  [id number]  :  1965  DOS:  23    Preop Dx:  Cholelithiasis and chronic cholecystitis without obstruction    Postop Dx:  Cholelithiasis and chronic cholecystitis without obstruction    Procedure:  Laparoscopic cholecystectomy  Surgeon:  Patrice Henson MD  Surgical Assistant.: Tretha Barthel, CSA  EBL: Blood Output: 5 mL (2022  2:51 AM)    Complication:  None    INDICATION:  Pt is a 65year old female who with Cholelithiasis and acute cholecystitis without obstruction [K80.00] who is scheduled for a Laparoscopic cholecystectomy, possible open cholecystectomy, possible intraoperative cholangiogram.    CONSENT:  An informed consent discussion was held with the patient regarding the nature of Cholelithiasis and acute cholecystitis without obstruction [K80.00], the treatment options and the details of the procedure. The risks including but not limited to bleeding, wound infection, intra-abdominal infection, injury to the liver, colon, small intestine, pancreas, stomach, common bile duct, incomplete resection, cystic duct stump leak, retained stone and incisional hernia were discussed. The patient expressed understanding and want to proceed with the planned procedure. TECHNIQUE:  The patient was taken to the OR and placed in supine position. General anesthesia was established and the abdomen was prepped in standard fashion. Pneumoperitoneum was obtained using open technique through a supra-umbilical incision. A 12-mm trocar was inserted under direct visualization and no injury occurred. Examination of the abdomen showed a thickened and inflamed appearing gallbladder consistent with Cholelithiasis and acute cholecystitis without obstruction [K80.00]. Two 5-mm trocars were placed in the epigastric and right abdomen. The patient was placed in reverse Trendelenburg position. The gallbladder was grasped and retracted cephalad and to the right.   The peritoneum along the medial and lateral aspect of the gallbladder/liver edge were freed with the [facility], small lymphatics were divided using the hook cautery. The lower 1/3 of the gallbladder was dissected from the liver. Two structures, identified as the cystic duct and artery, were visualized circumferentially with the 30 degree lens and noted to be entering the infundibulum, thus obtaining a critical view of safety. The cystic duct and artery were clipped and divided. The gallbladder was detached from the liver using hook cautery and delivered from the abdomen using an endocatch bag. The abdominal cavity was irrigated with saline and found to be hemostatic. The trocars were removed under direct visualization and no port site bleeding was seen. The supra-umbilical fascia was closed using 0 vicryl. The skin incisions were closed using 4-0 vicryl. Sterile dressings were applied. All instrument and sponge counts were correct. I was present during the entire procedure and performed the operation.

## 2022-11-17 NOTE — ANESTHESIA PROCEDURE NOTES
Airway  Date/Time: 11/17/2022 9:00 AM  Urgency: Elective      General Information and Staff    Patient location during procedure: OR  Anesthesiologist: Ion Hargrove MD  Performed: anesthesiologist     Indications and Patient Condition  Indications for airway management: anesthesia  Sedation level: deep  Preoxygenated: yes  Patient position: sniffing  Mask difficulty assessment: 2 - vent by mask + OA or adjuvant +/- NMBA    Final Airway Details  Final airway type: endotracheal airway      Successful airway: ETT  Cuffed: yes   Successful intubation technique: direct laryngoscopy  Endotracheal tube insertion site: oral  Blade: [place] Torre  Blade size: #2  ETT size (mm): 7.0    Cormack-Lehane Classification: grade I - full view of glottis  Placement verified by: chest auscultation and capnometry   Measured from: lips  ETT to lips (cm): 22  Number of attempts at approach: 1  Number of other approaches attempted: 0

## 2022-12-02 ENCOUNTER — OFFICE VISIT (OUTPATIENT)
Dept: SURGERY | Facility: CLINIC | Age: 57
End: 2022-12-02
Payer: COMMERCIAL

## 2022-12-02 VITALS — HEIGHT: 65 IN | BODY MASS INDEX: 24.32 KG/M2 | WEIGHT: 146 LBS

## 2022-12-02 DIAGNOSIS — Z98.890 POST-OPERATIVE STATE: Primary | ICD-10-CM

## 2022-12-02 PROCEDURE — 99024 POSTOP FOLLOW-UP VISIT: CPT | Performed by: SURGERY

## 2022-12-02 PROCEDURE — 3008F BODY MASS INDEX DOCD: CPT | Performed by: SURGERY

## (undated) DEVICE — PLUMEPORT ACTIV LAPAROSCOPIC SMOKE FILTRATION DEVICE: Brand: PLUMEPORT ACTIVE

## (undated) DEVICE — SOLUTION  .9 3000ML

## (undated) DEVICE — EVACUATOR URO RELIVAC 100CC

## (undated) DEVICE — TROCARS: Brand: KII® BALLOON BLUNT TIP SYSTEM

## (undated) DEVICE — SOLUTION  .9 1000ML BTL

## (undated) DEVICE — TISSUE RETRIEVAL SYSTEM: Brand: INZII RETRIEVAL SYSTEM

## (undated) DEVICE — GAMMEX® PI HYBRID SIZE 6.5, STERILE POWDER-FREE SURGICAL GLOVE, POLYISOPRENE AND NEOPRENE BLEND: Brand: GAMMEX

## (undated) DEVICE — LAP CHOLE: Brand: MEDLINE INDUSTRIES, INC.

## (undated) DEVICE — TROCAR: Brand: KII® SLEEVE

## (undated) DEVICE — [HIGH FLOW INSUFFLATOR,  DO NOT USE IF PACKAGE IS DAMAGED,  KEEP DRY,  KEEP AWAY FROM SUNLIGHT,  PROTECT FROM HEAT AND RADIOACTIVE SOURCES.]: Brand: PNEUMOSURE

## (undated) DEVICE — TROCAR: Brand: KII FIOS FIRST ENTRY

## (undated) DEVICE — LIGAMAX 5 MM ENDOSCOPIC MULTIPLE CLIP APPLIER: Brand: LIGAMAX

## (undated) DEVICE — UNDYED BRAIDED (POLYGLACTIN 910), SYNTHETIC ABSORBABLE SUTURE: Brand: COATED VICRYL

## (undated) DEVICE — CLOSURE EXOFIN 1.0ML

## (undated) DEVICE — DRAIN SILICONE FLAT 10X20

## (undated) DEVICE — SUT VICRYL 0 UR-6 J603H

## (undated) NOTE — LETTER
[address]  Authorization for Surgical Operation and Procedure                                                                                           1. I hereby Saturnino Swanson MD, my physician and his/her assistants (if applicable), which may include medical students, residents, and/or fellows, to perform the following surgical operation/ procedure and administer such anesthesia as may be determined necessary by my physician: Operation/Procedure name (s) Laparoscopic cholecystectomy, possible open cholecystectomy, possible intraoperative cholangiogram on [address]   2. I recognize that during the surgical operation/procedure, unforeseen conditions may necessitate additional or different procedures than those listed above. I, therefore, further authorize and request that the above-named surgeon, assistants, or designees perform such procedures as are, in their judgment, necessary and desirable. 3.   My surgeon/physician has discussed prior to my surgery the potential benefits, risks and side effects of this procedure; the likelihood of achieving goals; and potential problems that might occur during recuperation. They also discussed reasonable alternatives to the procedure, including risks, benefits, and side effects related to the alternatives and risks related to not receiving this procedure. I have had all my questions answered and I acknowledge that no guarantee has been made as to the result that may be obtained. 4.   Should the need arise during my operation/procedure, which includes change of level of care prior to discharge, I also consent to the administration of blood and/or blood products. Further, I understand that despite careful testing and screening of blood or blood products by collecting agencies, I may still be subject to ill effects as a result of receiving a blood transfusion and/or blood products.   The following are some, but not all, of the potential risks that can occur: fever and allergic reactions, hemolytic reactions, transmission of diseases such as Hepatitis, AIDS and Cytomegalovirus (CMV) and fluid overload. In the event that I wish to have an autologous transfusion of my own blood, or a directed donor transfusion, I will discuss this with my physician. Check only if Refusing Blood or Blood Products  I understand refusal of blood or blood products as deemed necessary by my physician may have serious consequences to my condition to include possible death. I hereby assume responsibility for my refusal and release the hospital, its personnel, and my physicians from any responsibility for the consequences of my refusal.           ____ Refuse      5. I authorize the use of any specimen, organs, tissues, body parts or foreign objects that may be removed from my body during the operation/procedure for diagnosis, research or teaching purposes and their subsequent disposal by hospital authorities. I also authorize the release of specimen test results and/or written reports to my treating physician on the hospital medical staff or other referring or consulting physicians involved in my care, at the discretion of the Pathologist or my treating physician. 6.   I consent to the photographing or videotaping of the operations or procedures to be performed, including appropriate portions of my body for medical, scientific, or educational purposes, provided my identity is not revealed by the pictures or by descriptive texts accompanying them. If the procedure has been photographed/videotaped, the surgeon will obtain the original picture, image, videotape or CD. The hospital will not be responsible for storage, release or maintenance of the picture, image, tape or CD.    7.   I consent to the presence of a  or observers in the operating room as deemed necessary by my physician or their designees.     8.   I recognize that in the event my procedure results in extended X-Ray/fluoroscopy time, I may develop a skin reaction. 9. If I have a Do Not Attempt Resuscitation (DNAR) order in place, that status will be suspended while in the operating room, procedural suite, and during the recovery period unless otherwise explicitly stated by me (or a person authorized to consent on my behalf). The surgeon or my attending physician will determine when the applicable recovery period ends for purposes of reinstating the DNAR order. 10. Patients having a sterilization procedure: I understand that if the procedure is successful the results will be permanent and it will therefore be impossible for me to inseminate, conceive, or bear children. I also understand that the procedure is intended to result in sterility, although the result has not been guaranteed. 11. I acknowledge that my physician has explained sedation/analgesia administration to me including the risk and benefits I consent to the administration of sedation/analgesia as may be necessary or desirable in the judgment of my physician. I CERTIFY THAT I HAVE READ AND FULLY UNDERSTAND THE ABOVE CONSENT TO OPERATION and/or OTHER PROCEDURE.     _________________________________________ _________________________________     ___________________________________  Signature of Patient     Signature of Responsible Person                   Printed Name of Responsible Person                              _________________________________________ ______________________________        ___________________________________  Signature of Witness         Date  Time         Relationship to Patient    STATEMENT OF PHYSICIAN My signature below affirms that prior to the time of the procedure; I have explained to the patient and/or his/her legal representative, the risks and benefits involved in the proposed treatment and any reasonable alternative to the proposed treatment.  I have also explained the risks and benefits involved in refusal of the proposed treatment and alternatives to the proposed treatment and have answered the patient's questions.  If I have a significant financial interest in a co-management agreement or a significant financial interest in any product or implant, or other significant relationship used in this procedure/surgery, I have disclosed this and had a discussion with my patient.     _______________________________________________________________ _____________________________  Ann-Marie Dear of Physician)                                                                                         (Date)                                   (Time)  Patient Name: Len Mcneil    : 1965   Printed: 2022      Medical Record #: [id number]                                              Page 1 of 1